# Patient Record
Sex: FEMALE | Race: WHITE | URBAN - METROPOLITAN AREA
[De-identification: names, ages, dates, MRNs, and addresses within clinical notes are randomized per-mention and may not be internally consistent; named-entity substitution may affect disease eponyms.]

---

## 2017-11-21 ENCOUNTER — APPOINTMENT (OUTPATIENT)
Age: 32
Setting detail: DERMATOLOGY
End: 2017-11-25

## 2017-11-21 DIAGNOSIS — H61.11 ACQUIRED DEFORMITY OF PINNA: ICD-10-CM

## 2017-11-21 PROBLEM — H61.113 ACQUIRED DEFORMITY OF PINNA, BILATERAL: Status: ACTIVE | Noted: 2017-11-21

## 2017-11-21 PROCEDURE — OTHER MIPS QUALITY: OTHER

## 2017-11-21 PROCEDURE — OTHER CONSULTATION - STRETCHED EARLOBE: OTHER

## 2017-11-21 PROCEDURE — OTHER DEFER: OTHER

## 2017-11-21 ASSESSMENT — LOCATION DETAILED DESCRIPTION DERM
LOCATION DETAILED: LEFT ANTERIOR EARLOBE
LOCATION DETAILED: RIGHT ANTERIOR EARLOBE

## 2017-11-21 ASSESSMENT — LOCATION ZONE DERM: LOCATION ZONE: EAR

## 2017-11-21 ASSESSMENT — LOCATION SIMPLE DESCRIPTION DERM
LOCATION SIMPLE: RIGHT EAR
LOCATION SIMPLE: LEFT EAR

## 2017-11-21 NOTE — PROCEDURE: DEFER
Instructions (Optional): Patient to schedule bilateral earlobe reconstruction.  She was quoted $500/ear and has paid $100 deposit.  She is to pay balance of $900 at TOS. Patient expressed understanding. DE
Detail Level: Detailed
Procedure To Be Performed At Next Visit: Earlobe repair

## 2017-11-21 NOTE — PROCEDURE: MIPS QUALITY
Quality 226: Preventive Care And Screening: Tobacco Use: Screening And Cessation Intervention: Patient screened for tobacco and never smoked
Quality 431: Preventive Care And Screening: Unhealthy Alcohol Use - Screening: Patient screened for unhealthy alcohol use using a single question and scores less than 2 times per year
Detail Level: Detailed
Quality 110: Preventive Care And Screening: Influenza Immunization: Influenza Immunization previously received during influenza season

## 2017-12-19 ENCOUNTER — APPOINTMENT (OUTPATIENT)
Age: 32
Setting detail: DERMATOLOGY
End: 2017-12-24

## 2017-12-19 DIAGNOSIS — H61.11 ACQUIRED DEFORMITY OF PINNA: ICD-10-CM

## 2017-12-19 PROBLEM — H61.113 ACQUIRED DEFORMITY OF PINNA, BILATERAL: Status: ACTIVE | Noted: 2017-12-19

## 2017-12-19 PROCEDURE — OTHER CONSULTATION - STRETCHED EARLOBE: OTHER

## 2017-12-19 PROCEDURE — OTHER EAR LOBE REPAIR COSMETIC: OTHER

## 2017-12-19 ASSESSMENT — LOCATION SIMPLE DESCRIPTION DERM
LOCATION SIMPLE: RIGHT EAR
LOCATION SIMPLE: LEFT EAR
LOCATION SIMPLE: LEFT EAR
LOCATION SIMPLE: RIGHT EAR

## 2017-12-19 ASSESSMENT — LOCATION DETAILED DESCRIPTION DERM
LOCATION DETAILED: RIGHT ANTERIOR EARLOBE
LOCATION DETAILED: LEFT ANTERIOR EARLOBE
LOCATION DETAILED: RIGHT ANTERIOR EARLOBE
LOCATION DETAILED: LEFT ANTERIOR EARLOBE

## 2017-12-19 ASSESSMENT — LOCATION ZONE DERM
LOCATION ZONE: EAR
LOCATION ZONE: EAR

## 2017-12-19 NOTE — PROCEDURE: EAR LOBE REPAIR COSMETIC
Repair Without Preservation Of Piercehole Text: The torn portion of the earlobe was excised completely with a #15 scalpel blade to create fresh edges. The edges were undermined slightly to allow them to splay for proper closure.
Positioning: The patient was placed in the standard supine position in the usual manner.
Price $ (Use Numbers Only, No Text Please.): 1000.00
Anesthesia Type: 1% lidocaine with epinephrine
Repair, Right Ear: Repair without Preservation of Piercehole
Suture Removal: 7 days
Consent: The benefits, alternatives, risks, and complications of earlobe repair were discussed including, but not limited to, the risks of pain, infection, bleeding, persistent or worse cosmetic defect, inability to hold earrings, requirement for additional piercing, asymmetry, and need for further repair, among others.
Epidermal Sutures: 6-0 Prolene
Repair With Preservation Of Piercehole Text: The torn portion of the earlobe was excised completely with a #15 scalpel blade to create fresh edges. The existing piercehole was maintained. The edges were undermined slightly to allow them to splay for proper closure.
Hemostasis: electrocautery
Dermal Sutures: 5-0 Monocryl
Detail Level: Detailed
Repair With Preservation Of Piercehole And Stent Text: The torn portion of the earlobe was excised completely with a #15 scalpel blade to create fresh edges. The edges were undermined slightly to allow them to splay for closure. A stent was created to create a new piercehole at the time of repair and placed in appropriate position.
Surgeon: Rod Chang M.D.
Estimated Blood Loss (Cc): minimal
Epidermal Closure: running

## 2017-12-26 ENCOUNTER — APPOINTMENT (OUTPATIENT)
Age: 32
Setting detail: DERMATOLOGY
End: 2017-12-31

## 2017-12-26 DIAGNOSIS — Z48.02 ENCOUNTER FOR REMOVAL OF SUTURES: ICD-10-CM

## 2017-12-26 PROCEDURE — OTHER SUTURE REMOVAL (GLOBAL PERIOD): OTHER

## 2017-12-26 PROCEDURE — 99024 POSTOP FOLLOW-UP VISIT: CPT

## 2017-12-26 ASSESSMENT — LOCATION DETAILED DESCRIPTION DERM
LOCATION DETAILED: LEFT ANTERIOR EARLOBE
LOCATION DETAILED: RIGHT ANTERIOR EARLOBE

## 2017-12-26 ASSESSMENT — LOCATION ZONE DERM: LOCATION ZONE: EAR

## 2017-12-26 ASSESSMENT — LOCATION SIMPLE DESCRIPTION DERM
LOCATION SIMPLE: LEFT EAR
LOCATION SIMPLE: RIGHT EAR

## 2017-12-26 NOTE — PROCEDURE: SUTURE REMOVAL (GLOBAL PERIOD)
Detail Level: Detailed
Add 12570 Cpt? (Important Note: In 2017 The Use Of 04388 Is Being Tracked By Cms To Determine Future Global Period Reimbursement For Global Periods): yes